# Patient Record
Sex: FEMALE | Race: WHITE | Employment: OTHER | URBAN - METROPOLITAN AREA
[De-identification: names, ages, dates, MRNs, and addresses within clinical notes are randomized per-mention and may not be internally consistent; named-entity substitution may affect disease eponyms.]

---

## 2017-03-16 ENCOUNTER — TELEPHONE (OUTPATIENT)
Dept: DERMATOLOGY | Facility: AMBULATORY SURGERY CENTER | Age: 77
End: 2017-03-16

## 2017-03-16 NOTE — TELEPHONE ENCOUNTER
Patient Appointment Date: 03/20/2017    Hunter Belle, 76 y.o., female  Is calling for their Mohs Pre-Op Assessment    does not have Hepatitis C or HIV   does confirm site   does ID site. Allergies: Allergies   Allergen Reactions    Demerol [Meperidine] Palpitations         does have a Pacemaker  does not have a Defibrillator    does not need antibiotics    is not taking NSAIDs    is not taking aspirin    is not taking garlic  is not taking ginkgo  is not taking Ginseng  is not taking fish oils  is not taking vit E    does not take a blood thinner    is not taking Coumadin/Warfarin     Pre operative assessment questions asked to patient. Patient has a general understanding of the procedure, and has been versed that there will be local anesthesia used in the procedure and that She will be ok to drive themselves to and from the appointment.

## 2017-03-20 ENCOUNTER — OFFICE VISIT (OUTPATIENT)
Dept: DERMATOLOGY | Facility: AMBULATORY SURGERY CENTER | Age: 77
End: 2017-03-20

## 2017-03-20 VITALS
HEIGHT: 63 IN | WEIGHT: 124 LBS | OXYGEN SATURATION: 96 % | HEART RATE: 80 BPM | BODY MASS INDEX: 21.97 KG/M2 | TEMPERATURE: 98.6 F | DIASTOLIC BLOOD PRESSURE: 68 MMHG | SYSTOLIC BLOOD PRESSURE: 118 MMHG | RESPIRATION RATE: 18 BRPM

## 2017-03-20 DIAGNOSIS — C44.01 BASAL CELL CARCINOMA OF SKIN OF RIGHT UPPER LIP: Primary | ICD-10-CM

## 2017-03-20 RX ORDER — LIDOCAINE HYDROCHLORIDE AND EPINEPHRINE 10; 10 MG/ML; UG/ML
3 INJECTION, SOLUTION INFILTRATION; PERINEURAL ONCE
Qty: 3 ML | Refills: 0
Start: 2017-03-20 | End: 2017-03-20

## 2017-03-20 RX ORDER — BUPIVACAINE HYDROCHLORIDE AND EPINEPHRINE 2.5; 5 MG/ML; UG/ML
INJECTION, SOLUTION INFILTRATION; PERINEURAL
Qty: 1.5 ML | Refills: 0
Start: 2017-03-20 | End: 2017-12-05

## 2017-03-20 NOTE — PROGRESS NOTES
This note is written by Judd Kiran, as dictated by Kay Rahman. Jeri Chavez MD.    CC: Basal cell carcinoma on the right upper lip    History of present illness:     Laureen Montanez is a 76 y.o. female referred by Dr. Pattie De Jesus. She has a biopsy-proven basal cell carcinoma on the right upper lip. This is a new basal cell carcinoma present for more than 1 year described as a fluctuant, blister-like rough patch with scaling, bleeding, and growth, no prior treatment. Biopsy confirmed the diagnosis of basal cell carcinoma, and I reviewed the written pathology. She is feeling well and in her usual state of health today. She has no pain, no current illnesses, no other skin concerns. Her allergies, medications, medical, and social history are reviewed by me today. She has a pacemaker. Exam:     She is an awake, alert, and oriented 76 y.o. female who appears well and in no distress. There is no preauricular, submandibular, or cervical lymphadenopathy. I examined her face. She has an 8 x 5 mm pearly papule on her right upper lip. She confirms location. Assessment/plan:    1. Basal cell carcinoma, right upper lip. I discussed the diagnosis of basal cell carcinoma and summarized the pathology report. Mohs surgery is indicated by site and size. The procedure was discussed, verbal and written consent were obtained. I performed the procedure. Two stages were required to reach a tumor free plane. The surgical defect was managed with complex repair. There were no complications. She will follow up as needed as the site heals. Indications, risks, and options were discussed with Laureen Montanez preoperatively. Risks including, but not limited to: pain, bleeding, infection, tumor recurrence, scarring and damage to motor and/or sensory nerves, were discussed. Laureen Montanez chose Mohs surgery. Laureen Montanez was an acceptable surgery candidate.     Laureen Montanez was placed in the appropriate position on the operating table in the Mohs surgery procedure room. The area was prepped and draped in the standard manner. Gentian violet was used to outline the clinical margins of the tumor. Local anesthesia was then obtained. The grossly visible tumor was then removed, an underlying layer was excised and mapped according to the Mohs technique, and the individual specimens examined microscopically. The process was repeated until microscopic examination of the tissue specimens confirmed a tumor-free plane. Hemostasis was obtained with electrosurgery and pressure. The wound was covered between stages with moist saline gauze. Wound care instructions (written and verbal) and a follow up appointment were given to Carlos Loza before discharge. Carlos Loza was discharged in good condition. The wound management options of second intent healing, layered closure, local flap, and/or full thickness skin graft were discussed. Carlos Loza understands the aims, risks, alternatives, and possible complications and elects to proceed with a complex layered closure. Wound margins were made vertical, edges undermined in the muscular plane, standing cones corrected at both poles followed by layered closure. The wound was closed with buried 5-0 polysorb suture in the muscle and deep subcutis to reduce width of the wound, a second layer in the dermis to reduce tension on the skin edges, and skin edges were approximated with 6-0 fast gut suture. The final closure length was 30 mm. The wound was bandaged with Vaseline, Telfa, gauze and Coverroll. Wound care instructions (written and verbal) and a follow up appointment were given to Carlos Loza before discharge. Carlos Loza was discharged in good condition. 2. History of skin cancer. I discussed the diagnosis and recommend routine examinations with provider for surveillance.     The documentation recorded by the scribe accurately reflects the service I personally performed and the decisions made by me. [facility]   OFFICE PROCEDURE PROGRESS NOTE     Chart reviewed for the following:     Nikolas Dwyer MD, have reviewed the History, Physical and updated the Allergic reactions for Giselle Pickard 77 performed immediately prior to start of procedure:     Nikolas Chapa. Shirley Dwyer MD, have performed the following reviews on Luis Miller prior to the start of the procedure:     * Patient was identified by name and date of birth   * Agreement on procedure being performed was verified   * Risks and Benefits explained to the patient   * Procedure site verified and marked as necessary   * Patient was positioned for comfort   * Consent was signed and verified     Time: 10:18 AM  Date of procedure: 3/20/2017  Procedure performed by: Neetu Stoll.  Shirley Dwyer MD   Provider assisted by: LPN   Patient assisted by: self   How tolerated by patient: tolerated the procedure well with no complications   Comments: none

## 2017-03-20 NOTE — PATIENT INSTRUCTIONS
WOUND CARE INSTRUCTIONS    1. Keep the dressing clean and dry and do not remove for 48 hours. 2. Then change the dressing once a day as follows:  a. Wash hands before and after each dressing change. b. Remove dressing and wash site gently with mild soap and water, rinse, and pat dry.  c. Apply an ointment (Bacitracin, Polysporin, Neosporin, Petroleum jelly or Aquaphor). d. Apply a non-stick (Telfa) dressing or Band-Aid to cover the wound. Remove pressure bandage Wednesday, then wash gently and apply Vaseline and a band-aid to site daily for 1 week. 3. Watch for:  BLEEDING: A small amount of drainage may occur. If bleeding occurs, elevate and rest the surgery site. Apply gauze and steady pressure for 15 minutes. If bleeding continues, call this office. INFECTION: Signs of infection include increased redness, pain, warmth, drainage of pus, and fever. If this occurs, call this office. 4. Special Instructions (follow any that are checked):  · [] You have stitches that need to be removed in 0 days  · [x] Avoid bending at the waist and heavy lifting for two days. · [x] Sleep with your head elevated for the next two nights. · [x] Rest the surgery site and keep it elevated as much as possible for two days. · [x] You may apply an ice-pack for 10-15 minutes every waking hour for the rest of the day. · [x] Eat a soft diet and avoid hot food and hot drinks for the rest of the day. · [] Other instructions: Follow up as needed  Take Tylenol for pain as needed.   FOOD GUIDE-MECHANICAL SOFT DIET    FOOD GROUP RECOMMENDED AVOID   Beverages All None   Breads and Crackers -Soft breads, rolls  -Plain crackers softened in soup or beverage  -Pancakes, plain muffins, biscuits -Breads with nuts, thick crusts, dry bread, toast, or tough bread  -Breads with raisins if not tolerated, hard crackers   Cereals and Grains -Cooked cereals, dry cereals, pasta, noodles, rice, moist bread dressing -Cereals with raisins or nuts, granola-type cereals, coarse or dry cereals, such as shredded wheat or All Bran   Vegetables and Potatoes -Tender soft-cooked vegetables, vegetable juices -Raw or cooked vegetables with tough skins or seeds, fried or raw vegetables, cooked corn   Fruits and Juices -Fruit juices, ripe banana, melon, peeled peaches, pears, strawberries  -Cooked or thawed frozen fruit, applesauce, citrus sections  -Stewed prunes, other tender stewed dried fruit  -Canned peaches, pears, apricots, pineapple, fruit cocktail -Fruit with tough skin   (e.g., raw apple, dried fruit)   Meat, Meat Substitutes -Tender meat, fish, or poultry -Tough fibrous meat  (e.g, sausage casing, steak)   Entrees -Soft cheese  -Chopped or ground meats, poultry  -Soft casseroles  -Egg salad, hard cooked or scrambled eggs  -Smooth peanut butter, liverwurst  -Yogurt without nuts or coconut -Fried eggs  -Large sandwiches     Soups -Most soups -Soups with tough meats or vegetables   Desserts -Cake, tender cookies  -Ice cream, sherbet, gelatin, custard, pudding, frozen yogurt  -Pie: cream, custard, pumpkin, soft fruit  -Yogurt -Desserts containing nuts, coarse dried fruit, or tough fruit  -Desserts baked to a hard consistency   Sugars and Sweets Soft candy, jelly, smooth jam -Hard candy        Once the site is healed with no remaining bandages or open areas, protect your surgical site and scar from the sun, as this area will be more sensitive. Use a broad spectrum sunscreen SPF 30 or higher daily, and a chemical free product (one containing zinc oxide or titanium dioxide) is a good choice if the area is sensitive. You may begin to gently massage the surgical site in 2-3 weeks, rubbing in a circular motion along the scar. This can help reduce swelling and thickness of a scar. A scar cream may be used beginnning 1 month after the surgery. If you have any questions or concerns, please call our office Monday through Friday at [phone].

## 2017-03-20 NOTE — PROGRESS NOTES
Pre-op: Patient present today for the evaluation of basal cell carcinoma of the right upper lip. Procedure explained with full understanding. Vitals:    03/20/17 0955   BP: 118/68   Pulse: 80   Resp: 18   Temp: 98.6 °F (37 °C)   TempSrc: Oral   SpO2: 96%   Weight: 56.2 kg (124 lb)   Height: 5' 3\" (1.6 m)     preoperatively, will continue to monitor. Post-op: Written and verbal post-op wound care instructions given to patient with full understanding of care. Surgical wound bandaged with Vaseline, Telfa, 2x2 gauze, and coverall tape. All questions and concerns addressed. Vitals stable postoperatively.

## 2017-03-20 NOTE — MR AVS SNAPSHOT
Visit Information Date & Time Provider Department Dept. Phone Encounter #  
 3/20/2017 10:00 AM MD Keith Greenberg [id number] Upcoming Health Maintenance Date Due  
 GLAUCOMA SCREENING Q2Y 1/3/2003 OSTEOPOROSIS SCREENING (DEXA) 1/3/2003 Pneumococcal 65+ Low/Medium Risk (2 of 2 - PPSV23) 1/2/2010 MEDICARE YEARLY EXAM 9/4/2014 INFLUENZA AGE 9 TO ADULT 8/1/2016 DTaP/Tdap/Td series (2 - Td) 11/15/2022 Allergies as of 3/20/2017  Review Complete On: 3/20/2017 By: Cecille Darling LPN Severity Noted Reaction Type Reactions Demerol [Meperidine]  12/07/2011    Palpitations Current Immunizations  Reviewed on 9/3/2013 Name Date Pneumococcal Vaccine (Unspecified Type) 1/2/2005 TDAP Vaccine 11/15/2012 Zoster Vaccine, Live 1/2/2010 Not reviewed this visit You Were Diagnosed With   
  
 Codes Comments Basal cell carcinoma of skin of right upper lip    -  Primary ICD-10-CM: C44.01 
ICD-9-CM: 173.01 Vitals BP Pulse Temp Resp Height(growth percentile) Weight(growth percentile)  
 118/68 80 98.6 °F (37 °C) (Oral) 18 5' 3\" (1.6 m) 124 lb (56.2 kg) SpO2 BMI OB Status Smoking Status 96% 21.97 kg/m2 Hysterectomy Never Smoker Vitals History BMI and BSA Data Body Mass Index Body Surface Area  
 21.97 kg/m 2 1.58 m 2 Preferred Pharmacy Pharmacy Name Phone [id number] [phone] Your Updated Medication List  
  
   
This list is accurate as of: 3/20/17 10:41 AM.  Always use your most recent med list.  
  
  
  
  
 cefdinir 300 mg capsule Commonly known as:  OMNICEF Take 2 Caps by mouth daily for 10 days. docusate sodium 100 mg capsule Commonly known as:  [place] Take 100 mg by mouth two (2) times a day.   
  
 estradiol 0.01 % (0.1 mg/gram) vaginal cream  
 Commonly known as:  ESTRACE Use  TIW  
  
 guaiFENesin-codeine 100-10 mg/5 mL solution Commonly known as:  ROBITUSSIN AC Take 5 mL by mouth four (4) times daily as needed for Cough. Max Daily Amount: 20 mL. Patient Instructions WOUND CARE INSTRUCTIONS 1. Keep the dressing clean and dry and do not remove for 48 hours. 2. Then change the dressing once a day as follows: 
a. Wash hands before and after each dressing change. b. Remove dressing and wash site gently with mild soap and water, rinse, and pat dry. 
c. Apply an ointment (Bacitracin, Polysporin, Neosporin, Petroleum jelly or Aquaphor). d. Apply a non-stick (Telfa) dressing or Band-Aid to cover the wound. Remove pressure bandage Wednesday, then wash gently and apply Vaseline and a band-aid to site daily for 1 week. 3. Watch for: BLEEDING: A small amount of drainage may occur. If bleeding occurs, elevate and rest the surgery site. Apply gauze and steady pressure for 15 minutes. If bleeding continues, call this office. INFECTION: Signs of infection include increased redness, pain, warmth, drainage of pus, and fever. If this occurs, call this office. 4. Special Instructions (follow any that are checked): ·  You have stitches that need to be removed in 0 days ·  Avoid bending at the waist and heavy lifting for two days. ·  Sleep with your head elevated for the next two nights. ·  Rest the surgery site and keep it elevated as much as possible for two days. ·  You may apply an ice-pack for 10-15 minutes every waking hour for the rest of the day. ·  Eat a soft diet and avoid hot food and hot drinks for the rest of the day. ·  Other instructions: Follow up as needed Take Tylenol for pain as needed. FOOD GUIDE-MECHANICAL SOFT DIET 
 
FOOD GROUP RECOMMENDED AVOID Beverages All None Breads and Crackers -Soft breads, rolls 
-Plain crackers softened in soup or beverage -Pancakes, plain muffins, biscuits -Breads with nuts, thick crusts, dry bread, toast, or tough bread 
-Breads with raisins if not tolerated, hard crackers Cereals and Grains -Cooked cereals, dry cereals, pasta, noodles, rice, moist bread dressing -Cereals with raisins or nuts, granola-type cereals, coarse or dry cereals, such as shredded wheat or All Bran Vegetables and Potatoes -Tender soft-cooked vegetables, vegetable juices -Raw or cooked vegetables with tough skins or seeds, fried or raw vegetables, cooked corn Fruits and Juices -Fruit juices, ripe banana, melon, peeled peaches, pears, strawberries 
-Cooked or thawed frozen fruit, applesauce, citrus sections -Stewed prunes, other tender stewed dried fruit 
-Canned peaches, pears, apricots, pineapple, fruit cocktail -Fruit with tough skin  
(e.g., raw apple, dried fruit) Meat, Meat Substitutes -Tender meat, fish, or poultry -Tough fibrous meat 
(e.g, sausage casing, steak) Entrees -Soft cheese 
-Chopped or ground meats, poultry -Soft casseroles 
-Egg salad, hard cooked or scrambled eggs 
-Smooth peanut butter, liverwurst 
-Yogurt without nuts or coconut -Fried eggs 
-Large sandwiches Soups -Most soups -Soups with tough meats or vegetables Desserts -Cake, tender cookies -Ice cream, sherbet, gelatin, custard, pudding, frozen yogurt 
-Pie: cream, custard, pumpkin, soft fruit 
-Yogurt -Desserts containing nuts, coarse dried fruit, or tough fruit 
-Desserts baked to a hard consistency Sugars and Sweets Soft candy, jelly, smooth jam -Hard candy Once the site is healed with no remaining bandages or open areas, protect your surgical site and scar from the sun, as this area will be more sensitive. Use a broad spectrum sunscreen SPF 30 or higher daily, and a chemical free product (one containing zinc oxide or titanium dioxide) is a good choice if the area is sensitive. You may begin to gently massage the surgical site in 2-3 weeks, rubbing in a circular motion along the scar. This can help reduce swelling and thickness of a scar. A scar cream may be used beginnning 1 month after the surgery. If you have any questions or concerns, please call our office Monday through Friday at [phone]. Introducing [facility] SERVICES! Nadia Willis introduces [employer] patient portal. Now you can access parts of your medical record, email your doctor's office, and request medication refills online. 1. In your internet browser, go to [email or web address] 2. Click on the First Time User? Click Here link in the Sign In box. You will see the New Member Sign Up page. 3. Enter your [employer] Access Code exactly as it appears below. You will not need to use this code after youve completed the sign-up process. If you do not sign up before the expiration date, you must request a new code. · [employer] Access Code: [email or web address] Expires: 6/12/2017  4:19 PM 
 
4. Enter the last four digits of your Social Security Number (xxxx) and Date of Birth (mm/dd/yyyy) as indicated and click Submit. You will be taken to the next sign-up page. 5. Create a Bgif[employer] ID. This will be your Bgif[employer] login ID and cannot be changed, so think of one that is secure and easy to remember. 6. Create a [employer] password. You can change your password at any time. 7. Enter your Password Reset Question and Answer. This can be used at a later time if you forget your password. 8. Enter your e-mail address. You will receive e-mail notification when new information is available in [address] 9. Click Sign Up. You can now view and download portions of your medical record. 10. Click the Download Summary menu link to download a portable copy of your medical information.  
 
If you have questions, please visit the Frequently Asked Questions section of the [employer]. Remember, [employer] is NOT to be used for urgent needs. For medical emergencies, dial 911. Now available from your iPhone and Android! Please provide this summary of care documentation to your next provider. Your primary care clinician is listed as Idania. If you have any questions after today's visit, please call [phone].

## 2017-03-20 NOTE — PROGRESS NOTES
Pre-op: Patient present today for the evaluation of basal cell carcinoma to the right upper lip. Procedure explained with full understanding. Vitals:    03/20/17 0955   BP: 118/68   Pulse: 80   Resp: 18   Temp: 98.6 °F (37 °C)   TempSrc: Oral   SpO2: 96%   Weight: 56.2 kg (124 lb)   Height: 5' 3\" (1.6 m)     preoperatively, will continue to monitor. Post-op: Written and verbal post-op wound care instructions given to patient with full understanding of care. Surgical wound bandaged with Vaseline, Telfa,  2x2 gauze, and coverall tape. All questions and concerns addressed. Vitals stable postoperatively.

## 2017-11-09 RX ORDER — ESTRADIOL 0.1 MG/G
CREAM VAGINAL
Qty: 42.5 G | Refills: 2 | Status: SHIPPED | OUTPATIENT
Start: 2017-11-09

## 2020-08-04 PROBLEM — E55.9 VITAMIN D DEFICIENCY: Status: ACTIVE | Noted: 2020-08-04

## 2021-03-31 ENCOUNTER — TRANSCRIBE ORDER (OUTPATIENT)
Dept: SCHEDULING | Age: 81
End: 2021-03-31

## 2021-03-31 DIAGNOSIS — M65.9 SYNOVITIS OF RIGHT ANKLE: Primary | ICD-10-CM

## 2022-03-19 PROBLEM — E55.9 VITAMIN D DEFICIENCY: Status: ACTIVE | Noted: 2020-08-04

## 2023-05-07 PROBLEM — D69.2 SENILE PURPURA (HCC): Status: ACTIVE | Noted: 2023-05-07

## 2023-05-09 ENCOUNTER — TRANSCRIBE ORDERS (OUTPATIENT)
Facility: HOSPITAL | Age: 83
End: 2023-05-09

## 2023-05-09 DIAGNOSIS — Z78.0 ASYMPTOMATIC MENOPAUSAL STATE: Primary | ICD-10-CM

## 2023-05-12 ENCOUNTER — HOSPITAL ENCOUNTER (OUTPATIENT)
Age: 83
End: 2023-05-12
Payer: MEDICARE

## 2023-05-12 DIAGNOSIS — Z78.0 ASYMPTOMATIC MENOPAUSAL STATE: ICD-10-CM

## 2023-05-12 PROCEDURE — 77080 DXA BONE DENSITY AXIAL: CPT

## 2024-05-24 ENCOUNTER — HOSPITAL ENCOUNTER (OUTPATIENT)
Facility: HOSPITAL | Age: 84
End: 2024-05-24
Attending: SPECIALIST
Payer: MEDICARE

## 2024-05-24 DIAGNOSIS — H93.233 HYPERACUSIS OF BOTH EARS: ICD-10-CM

## 2024-05-24 DIAGNOSIS — H90.A21 SENSORINEURAL HEARING LOSS (SNHL) OF RIGHT EAR WITH RESTRICTED HEARING OF LEFT EAR: ICD-10-CM

## 2024-05-24 LAB — CREAT BLD-MCNC: 0.7 MG/DL (ref 0.6–1.3)

## 2024-05-24 PROCEDURE — 82565 ASSAY OF CREATININE: CPT

## 2024-05-24 PROCEDURE — 70481 CT ORBIT/EAR/FOSSA W/DYE: CPT

## 2024-05-24 PROCEDURE — 6360000004 HC RX CONTRAST MEDICATION: Performed by: STUDENT IN AN ORGANIZED HEALTH CARE EDUCATION/TRAINING PROGRAM

## 2024-05-24 RX ADMIN — IOPAMIDOL 100 ML: 612 INJECTION, SOLUTION INTRAVENOUS at 17:25

## 2024-12-16 ENCOUNTER — OFFICE VISIT (OUTPATIENT)
Age: 84
End: 2024-12-16

## 2024-12-16 VITALS
OXYGEN SATURATION: 95 % | BODY MASS INDEX: 20.09 KG/M2 | TEMPERATURE: 98.5 F | DIASTOLIC BLOOD PRESSURE: 73 MMHG | SYSTOLIC BLOOD PRESSURE: 126 MMHG | HEIGHT: 63 IN | HEART RATE: 83 BPM | WEIGHT: 113.4 LBS

## 2024-12-16 DIAGNOSIS — J06.9 VIRAL UPPER RESPIRATORY TRACT INFECTION: Primary | ICD-10-CM

## 2024-12-16 RX ORDER — FEXOFENADINE HCL 180 MG/1
180 TABLET ORAL DAILY
Qty: 30 TABLET | Refills: 0 | Status: SHIPPED | OUTPATIENT
Start: 2024-12-16 | End: 2025-01-15

## 2024-12-16 RX ORDER — FLUTICASONE PROPIONATE 50 MCG
2 SPRAY, SUSPENSION (ML) NASAL DAILY
Qty: 16 G | Refills: 0 | Status: SHIPPED | OUTPATIENT
Start: 2024-12-16

## 2024-12-16 NOTE — PROGRESS NOTES
uri     Subjective   History was provided by the patient.    Jennifer Dorsey is a 84 y.o. female who presents for evaluation of symptoms of a URI. Symptoms include post nasal drip, sore throat, and yellow nasal drainage. Onset of symptoms was 4 days ago, and is unchanged since that time. Evaluation to date: none. Treatment to date: none.       Objective   Physical Exam   General: alert, appears stated age, and cooperative  Ear exam: normal TM's and external ear canals both ears  Sinus exam: Normal paranasal sinuses without tenderness  Oropharynx exam: normal findings: pink and moist  Neck exam: no adenopathy  Heart exam: S1 and S2 normal  Lung exam: clear to auscultation bilaterally      Assessment   1. Viral upper respiratory tract infection  Patient appears well, VSS, afebrile, SPO2 95% on room air. No distress noted.  Ears normal.  Throat and pharynx normal.  Neck supple. No adenopathy in the neck. Nose is not congested. Sinuses non tender. The chest is clear, without wheezes or rales.    Patient is previously healthy and immunocompetent, presenting with symptoms suspicious for likely viral upper respiratory infection.  Differential includes acute bronchitis, rhinosinusitis, allergic rhinitis, bacterial pneumonia, or COVID.  Do not suspect underlying cardiopulmonary process.  I considered, but think unlikely, dangerous causes of this patient's symptoms to include ACS, CHF or COPD exacerbations, pneumonia, pneumothorax.  Patient is nontoxic appearing and not in need of emergent medical intervention.    The patient is a good candidate for outpatient therapy based on normal PO intake, reassuring exam with clear lungs on auscultation, normal oxygen saturations and lack of respiratory distress upon discharge.    Discharge decision based on the following:  clinical impression is consistent with outpatient treatment, patient's exam is stable and patient's condition is stable.  Patient advised if symptoms fail to improve

## 2024-12-16 NOTE — PATIENT INSTRUCTIONS
If symptoms worsens or fail to improve follow-up with PCP or ER.    Thank you for visiting [facility] today    Nasal Congestion:  Flonase or Nasonex (over the counter) nasal spray, once a day  Saline irrigation kits help wash out sinuses 1-2 times a day  Normal saline nasal spray  Cough:  Throat lozenges, hot tea, and honey may help  Vicks VapoRub at night to help with cough and relieve muscles aches and pain  If you have high blood pressure, take Coricidin HBP (or the generic form) instead.  Follow instructions on the box.  Sore Throat:  Lozenges, as needed. Cepacol lozenges will help numb the throat  Chloraseptic spray also helps to numb throat pain  Salt water gargles to soothe throat pain  Sinus pain/pressure:  Warm, wet towel on face to help with facial sinus pain/pressure  Headache/Pain Fever/Body Aches:  If you can take NSAIDs, take Ibuprofen 400-800mg every 8 hours as needed  If you cannot take NSAIDs, take Tylenol 325-500mg every 6 hours as needed  Miscellanous:  Zyrtec/Xyzal/Allegra/Claritin during the day.  Simple foods like chicken noodle soup, smoothies, hot tea with lemon and honey may also help  Avoid smoking  Minimize exposure to irritants    Please follow up with your primary care provider within 2-5 days if your signs and symptoms have not resolved or worsened.    Please go immediately to the Emergency Department if you develop shortness of breath, chest pain and uncontrollable fever above 100.4F.